# Patient Record
Sex: FEMALE | Race: WHITE | NOT HISPANIC OR LATINO | URBAN - METROPOLITAN AREA
[De-identification: names, ages, dates, MRNs, and addresses within clinical notes are randomized per-mention and may not be internally consistent; named-entity substitution may affect disease eponyms.]

---

## 2020-11-03 ENCOUNTER — APPOINTMENT (RX ONLY)
Dept: URBAN - METROPOLITAN AREA CLINIC 23 | Facility: CLINIC | Age: 60
Setting detail: DERMATOLOGY
End: 2020-11-03

## 2020-11-03 DIAGNOSIS — D22 MELANOCYTIC NEVI: ICD-10-CM

## 2020-11-03 DIAGNOSIS — L81.4 OTHER MELANIN HYPERPIGMENTATION: ICD-10-CM

## 2020-11-03 DIAGNOSIS — Z41.9 ENCOUNTER FOR PROCEDURE FOR PURPOSES OTHER THAN REMEDYING HEALTH STATE, UNSPECIFIED: ICD-10-CM

## 2020-11-03 DIAGNOSIS — L70.0 ACNE VULGARIS: ICD-10-CM

## 2020-11-03 DIAGNOSIS — L91.8 OTHER HYPERTROPHIC DISORDERS OF THE SKIN: ICD-10-CM

## 2020-11-03 DIAGNOSIS — Z71.89 OTHER SPECIFIED COUNSELING: ICD-10-CM

## 2020-11-03 DIAGNOSIS — L82.1 OTHER SEBORRHEIC KERATOSIS: ICD-10-CM

## 2020-11-03 DIAGNOSIS — D18.0 HEMANGIOMA: ICD-10-CM

## 2020-11-03 PROBLEM — D18.01 HEMANGIOMA OF SKIN AND SUBCUTANEOUS TISSUE: Status: ACTIVE | Noted: 2020-11-03

## 2020-11-03 PROBLEM — D22.5 MELANOCYTIC NEVI OF TRUNK: Status: ACTIVE | Noted: 2020-11-03

## 2020-11-03 PROCEDURE — ? COUNSELING

## 2020-11-03 PROCEDURE — 99203 OFFICE O/P NEW LOW 30 MIN: CPT | Mod: 25

## 2020-11-03 PROCEDURE — ? RECOMMENDATIONS

## 2020-11-03 PROCEDURE — 11200 RMVL SKIN TAGS UP TO&INC 15: CPT

## 2020-11-03 PROCEDURE — ? OTHER

## 2020-11-03 PROCEDURE — ? SKIN TAG REMOVAL

## 2020-11-03 PROCEDURE — ? PRESCRIPTION

## 2020-11-03 RX ORDER — TRETIONIN 1 MG/G
CREAM TOPICAL
Qty: 1 | Refills: 6 | Status: ERX | COMMUNITY
Start: 2020-11-03

## 2020-11-03 RX ADMIN — TRETIONIN: 1 CREAM TOPICAL at 00:00

## 2020-11-03 ASSESSMENT — LOCATION ZONE DERM
LOCATION ZONE: LEG
LOCATION ZONE: AXILLAE
LOCATION ZONE: TRUNK
LOCATION ZONE: FACE
LOCATION ZONE: NECK

## 2020-11-03 ASSESSMENT — LOCATION SIMPLE DESCRIPTION DERM
LOCATION SIMPLE: ABDOMEN
LOCATION SIMPLE: RIGHT FOREHEAD
LOCATION SIMPLE: LEFT AXILLARY VAULT
LOCATION SIMPLE: RIGHT LOWER BACK
LOCATION SIMPLE: TRAPEZIAL NECK
LOCATION SIMPLE: LEFT POPLITEAL SKIN
LOCATION SIMPLE: RIGHT CHEEK

## 2020-11-03 ASSESSMENT — LOCATION DETAILED DESCRIPTION DERM
LOCATION DETAILED: LEFT AXILLARY VAULT
LOCATION DETAILED: MID TRAPEZIAL NECK
LOCATION DETAILED: RIGHT INFERIOR MEDIAL MIDBACK
LOCATION DETAILED: EPIGASTRIC SKIN
LOCATION DETAILED: RIGHT FOREHEAD
LOCATION DETAILED: RIGHT CENTRAL MALAR CHEEK
LOCATION DETAILED: LEFT POPLITEAL SKIN

## 2020-11-03 NOTE — PROCEDURE: OTHER
Note Text (......Xxx Chief Complaint.): This diagnosis correlates with the
Other (Free Text): *** declines pathology ***
Detail Level: Detailed

## 2020-11-03 NOTE — PROCEDURE: RECOMMENDATIONS
Recommendation Preamble: The following recommendations were made during the visit: consultation with Karie
Detail Level: Zone

## 2020-11-03 NOTE — PROCEDURE: COUNSELING
Detail Level: Detailed
Spironolactone Pregnancy And Lactation Text: This medication can cause feminization of the male fetus and should be avoided during pregnancy. The active metabolite is also found in breast milk.
Topical Sulfur Applications Pregnancy And Lactation Text: This medication is Pregnancy Category C and has an unknown safety profile during pregnancy. It is unknown if this topical medication is excreted in breast milk.
Spironolactone Counseling: Patient advised regarding risks of diarrhea, abdominal pain, hyperkalemia, birth defects (for female patients), liver toxicity and renal toxicity. The patient may need blood work to monitor liver and kidney function and potassium levels while on therapy. The patient verbalized understanding of the proper use and possible adverse effects of spironolactone.  All of the patient's questions and concerns were addressed.
Dapsone Pregnancy And Lactation Text: This medication is Pregnancy Category C and is not considered safe during pregnancy or breast feeding.
Patient Specific Counseling (Will Not Stick From Patient To Patient): Declines any additional Rx treatment.
Topical Retinoid counseling:  Patient advised to apply a pea-sized amount only at bedtime and wait 30 minutes after washing their face before applying.  If too drying, patient may add a non-comedogenic moisturizer. The patient verbalized understanding of the proper use and possible adverse effects of retinoids.  All of the patient's questions and concerns were addressed.
Use Enhanced Medication Counseling?: No
Erythromycin Counseling:  I discussed with the patient the risks of erythromycin including but not limited to GI upset, allergic reaction, drug rash, diarrhea, increase in liver enzymes, and yeast infections.
Benzoyl Peroxide Pregnancy And Lactation Text: This medication is Pregnancy Category C. It is unknown if benzoyl peroxide is excreted in breast milk.
Bactrim Counseling:  I discussed with the patient the risks of sulfa antibiotics including but not limited to GI upset, allergic reaction, drug rash, diarrhea, dizziness, photosensitivity, and yeast infections.  Rarely, more serious reactions can occur including but not limited to aplastic anemia, agranulocytosis, methemoglobinemia, blood dyscrasias, liver or kidney failure, lung infiltrates or desquamative/blistering drug rashes.
Minocycline Pregnancy And Lactation Text: This medication is Pregnancy Category D and not consider safe during pregnancy. It is also excreted in breast milk.
Sarecycline Counseling: Patient advised regarding possible photosensitivity and discoloration of the teeth, skin, lips, tongue and gums.  Patient instructed to avoid sunlight, if possible.  When exposed to sunlight, patients should wear protective clothing, sunglasses, and sunscreen.  The patient was instructed to call the office immediately if the following severe adverse effects occur:  hearing changes, easy bruising/bleeding, severe headache, or vision changes.  The patient verbalized understanding of the proper use and possible adverse effects of sarecycline.  All of the patient's questions and concerns were addressed.
Isotretinoin Counseling: Patient should get monthly blood tests, not donate blood, not drive at night if vision affected, not share medication, and not undergo elective surgery for 6 months after tx completed. Side effects reviewed, pt to contact office should one occur.
Dapsone Counseling: I discussed with the patient the risks of dapsone including but not limited to hemolytic anemia, agranulocytosis, rashes, methemoglobinemia, kidney failure, peripheral neuropathy, headaches, GI upset, and liver toxicity.  Patients who start dapsone require monitoring including baseline LFTs and weekly CBCs for the first month, then every month thereafter.  The patient verbalized understanding of the proper use and possible adverse effects of dapsone.  All of the patient's questions and concerns were addressed.
Topical Sulfur Applications Counseling: Topical Sulfur Counseling: Patient counseled that this medication may cause skin irritation or allergic reactions.  In the event of skin irritation, the patient was advised to reduce the amount of the drug applied or use it less frequently.   The patient verbalized understanding of the proper use and possible adverse effects of topical sulfur application.  All of the patient's questions and concerns were addressed.
Bactrim Pregnancy And Lactation Text: This medication is Pregnancy Category D and is known to cause fetal risk.  It is also excreted in breast milk.
Topical Clindamycin Counseling: Patient counseled that this medication may cause skin irritation or allergic reactions.  In the event of skin irritation, the patient was advised to reduce the amount of the drug applied or use it less frequently.   The patient verbalized understanding of the proper use and possible adverse effects of clindamycin.  All of the patient's questions and concerns were addressed.
Minocycline Counseling: Patient advised regarding possible photosensitivity and discoloration of the teeth, skin, lips, tongue and gums.  Patient instructed to avoid sunlight, if possible.  When exposed to sunlight, patients should wear protective clothing, sunglasses, and sunscreen.  The patient was instructed to call the office immediately if the following severe adverse effects occur:  hearing changes, easy bruising/bleeding, severe headache, or vision changes.  The patient verbalized understanding of the proper use and possible adverse effects of minocycline.  All of the patient's questions and concerns were addressed.
Topical Retinoid Pregnancy And Lactation Text: This medication is Pregnancy Category C. It is unknown if this medication is excreted in breast milk.
Doxycycline Pregnancy And Lactation Text: This medication is Pregnancy Category D and not consider safe during pregnancy. It is also excreted in breast milk but is considered safe for shorter treatment courses.
High Dose Vitamin A Pregnancy And Lactation Text: High dose vitamin A therapy is contraindicated during pregnancy and breast feeding.
High Dose Vitamin A Counseling: Side effects reviewed, pt to contact office should one occur.
Erythromycin Pregnancy And Lactation Text: This medication is Pregnancy Category B and is considered safe during pregnancy. It is also excreted in breast milk.
Birth Control Pills Pregnancy And Lactation Text: This medication should be avoided if pregnant and for the first 30 days post-partum.
Topical Clindamycin Pregnancy And Lactation Text: This medication is Pregnancy Category B and is considered safe during pregnancy. It is unknown if it is excreted in breast milk.
Doxycycline Counseling:  Patient counseled regarding possible photosensitivity and increased risk for sunburn.  Patient instructed to avoid sunlight, if possible.  When exposed to sunlight, patients should wear protective clothing, sunglasses, and sunscreen.  The patient was instructed to call the office immediately if the following severe adverse effects occur:  hearing changes, easy bruising/bleeding, severe headache, or vision changes.  The patient verbalized understanding of the proper use and possible adverse effects of doxycycline.  All of the patient's questions and concerns were addressed.
Tetracycline Counseling: Patient counseled regarding possible photosensitivity and increased risk for sunburn.  Patient instructed to avoid sunlight, if possible.  When exposed to sunlight, patients should wear protective clothing, sunglasses, and sunscreen.  The patient was instructed to call the office immediately if the following severe adverse effects occur:  hearing changes, easy bruising/bleeding, severe headache, or vision changes.  The patient verbalized understanding of the proper use and possible adverse effects of tetracycline.  All of the patient's questions and concerns were addressed. Patient understands to avoid pregnancy while on therapy due to potential birth defects.
Isotretinoin Pregnancy And Lactation Text: This medication is Pregnancy Category X and is considered extremely dangerous during pregnancy. It is unknown if it is excreted in breast milk.
Benzoyl Peroxide Counseling: Patient counseled that medicine may cause skin irritation and bleach clothing.  In the event of skin irritation, the patient was advised to reduce the amount of the drug applied or use it less frequently.   The patient verbalized understanding of the proper use and possible adverse effects of benzoyl peroxide.  All of the patient's questions and concerns were addressed.
Tazorac Pregnancy And Lactation Text: This medication is not safe during pregnancy. It is unknown if this medication is excreted in breast milk.
Azithromycin Counseling:  I discussed with the patient the risks of azithromycin including but not limited to GI upset, allergic reaction, drug rash, diarrhea, and yeast infections.
Azithromycin Pregnancy And Lactation Text: This medication is considered safe during pregnancy and is also secreted in breast milk.
Tazorac Counseling:  Patient advised that medication is irritating and drying.  Patient may need to apply sparingly and wash off after an hour before eventually leaving it on overnight.  The patient verbalized understanding of the proper use and possible adverse effects of tazorac.  All of the patient's questions and concerns were addressed.
Birth Control Pills Counseling: Birth Control Pill Counseling: I discussed with the patient the potential side effects of OCPs including but not limited to increased risk of stroke, heart attack, thrombophlebitis, deep venous thrombosis, hepatic adenomas, breast changes, GI upset, headaches, and depression.  The patient verbalized understanding of the proper use and possible adverse effects of OCPs. All of the patient's questions and concerns were addressed.

## 2020-11-03 NOTE — PROCEDURE: SKIN TAG REMOVAL
Anesthesia Volume In Cc: 0.5
Anesthesia Type: 1% lidocaine without epinephrine and a 1:10 solution of 8.4% sodium bicarbonate
Medical Necessity Clause: This procedure was medically necessary because the lesions that were treated were: irritated
Hemostasis: Electrocautery
Include Z78.9 (Other Specified Conditions Influencing Health Status) As An Associated Diagnosis?: No
Consent: Written consent obtained and the risks of skin tag removal was reviewed with the patient including but not limited to bleeding, pigmentary change, infection, pain, and remote possibility of scarring.
Medical Necessity Information: It is in your best interest to select a reason for this procedure from the list below. All of these items fulfill various CMS LCD requirements except the new and changing color options.
Add Associated Diagnoses If Applicable When Selecting Medical Necessity: Yes
Detail Level: Detailed

## 2020-12-21 ENCOUNTER — APPOINTMENT (RX ONLY)
Dept: URBAN - METROPOLITAN AREA CLINIC 23 | Facility: CLINIC | Age: 60
Setting detail: DERMATOLOGY
End: 2020-12-21

## 2021-04-05 LAB
MAMMOGRAPHY, EXTERNAL: NORMAL
MAMMOGRAPHY, EXTERNAL: NORMAL

## 2021-07-26 ENCOUNTER — APPOINTMENT (RX ONLY)
Dept: URBAN - METROPOLITAN AREA CLINIC 23 | Facility: CLINIC | Age: 61
Setting detail: DERMATOLOGY
End: 2021-07-26

## 2021-07-26 DIAGNOSIS — Z41.9 ENCOUNTER FOR PROCEDURE FOR PURPOSES OTHER THAN REMEDYING HEALTH STATE, UNSPECIFIED: ICD-10-CM

## 2021-07-26 PROCEDURE — ? COSMETIC CONSULTATION: PRODUCTS

## 2021-07-26 ASSESSMENT — LOCATION DETAILED DESCRIPTION DERM: LOCATION DETAILED: RIGHT MEDIAL MALAR CHEEK

## 2021-07-26 ASSESSMENT — LOCATION SIMPLE DESCRIPTION DERM: LOCATION SIMPLE: RIGHT CHEEK

## 2021-07-26 ASSESSMENT — LOCATION ZONE DERM: LOCATION ZONE: FACE

## 2021-07-26 NOTE — HPI: COSMETIC CONSULTATION
Additional History: Interested in maintenance\\nIPL 1-3 a year 300 or series of 4 960\\nvessels 1-2 needed then 1 ayera $150\\nsold vitamin c20% and elta clear tinted\\ntalked about anteage md and eltastiderm eye cream

## 2021-09-28 PROBLEM — R23.2 HOT FLASHES: Status: ACTIVE | Noted: 2019-10-28

## 2021-09-28 PROBLEM — F41.1 ANXIETY STATE: Status: ACTIVE | Noted: 2019-05-20

## 2021-09-28 PROBLEM — F32.A MILD DEPRESSION: Status: ACTIVE | Noted: 2019-05-20

## 2021-09-28 PROBLEM — E78.2 MIXED HYPERLIPIDEMIA: Status: ACTIVE | Noted: 2020-11-03

## 2021-09-28 PROBLEM — Z12.31 BREAST CANCER SCREENING BY MAMMOGRAM: Status: ACTIVE | Noted: 2020-11-03

## 2021-09-28 PROBLEM — I10 ESSENTIAL HYPERTENSION: Status: ACTIVE | Noted: 2019-05-20

## 2021-09-28 PROBLEM — Z78.0 MENOPAUSE PRESENT: Status: ACTIVE | Noted: 2019-05-20

## 2021-09-28 PROBLEM — E03.9 ACQUIRED HYPOTHYROIDISM: Status: ACTIVE | Noted: 2019-05-20

## 2021-09-29 PROBLEM — N39.3 STRESS INCONTINENCE IN FEMALE: Status: ACTIVE | Noted: 2021-09-29

## 2021-09-29 PROBLEM — Z12.11 ENCOUNTER FOR SCREENING COLONOSCOPY: Status: ACTIVE | Noted: 2021-09-29

## 2021-09-29 PROBLEM — Z23 NEED FOR IMMUNIZATION AGAINST INFLUENZA: Status: ACTIVE | Noted: 2019-10-28

## 2021-09-29 PROBLEM — E66.9 OBESITY (BMI 30-39.9): Status: ACTIVE | Noted: 2021-09-29

## 2021-09-29 PROBLEM — Z00.00 PREVENTATIVE HEALTH CARE: Status: ACTIVE | Noted: 2021-09-29

## 2021-10-27 ENCOUNTER — HOSPITAL ENCOUNTER (OUTPATIENT)
Dept: NUTRITION | Age: 61
Discharge: HOME | End: 2021-10-27
Payer: COMMERCIAL

## 2021-10-27 PROCEDURE — 97802 MEDICAL NUTRITION INDIV IN: CPT

## 2021-10-27 NOTE — PROGRESS NOTES
Sudhir Petit, MS, RD, LD  Outpatient Registered Dietitian  [facility]  Phone: [phone]  Fax: [phone]    ASSESSMENT  Pt is a 62 y.o. female referred to us by Dr. Gumaro Clarke with the following diagnosis (es):  Asymptomatic menopausal state [Z78.0]  Obesity, unspecified [E66.9] for weight management MNT. Pt shares that she began gaining weight after the birth of her 2nd child in 57. In 2008 after a her divorce she unintentionally lost weight  \"went from a size 12 to a size 4\". She started struggling with regaining weight in 2016, gradually gaining from 155 pounds to now between 170-175 pounds. Office weight today= 173.3 pounds. She voices trying \"every diet\" from [employer], and keto without success. Voices she is motivated today to make behaviors changes to support her weight goals because she values her healthy and longevity, and would like to prevent diabetes. Factors identified that effect eating habits and lifestyle include emotional eater, eats out 2-3x per week, and loves to eat. Patient Active Problem List   Diagnosis Code    Acquired hypothyroidism E03.9    Essential hypertension I10    Menopause present Z78.0    Hot flashes R23.2    Mild depression ([address] F32.0    Mixed hyperlipidemia E78.2    Anxiety state F41.1    Breast cancer screening by mammogram Z12.31    Need for immunization against influenza Z23    Encounter for screening colonoscopy Z12.11    Obesity (BMI 30-39. 9) E66.9    Stress incontinence in female N39.3       Lab Results   Component Value Date/Time    Hemoglobin A1c 5.7 (H) 09/29/2021 10:47 AM    Glucose 98 09/29/2021 10:47 AM    Microalb/Creat ratio (ug/mg creat.) <4 09/29/2021 11:19 AM    Creatinine 0.73 09/29/2021 10:47 AM        Meds: Cymbalta, synthroid, Vitamin D3.    Barriers to change: established food preferences/eating behaviors    Social Support: Retired , now is a , and currently is in school for student counseling. Lives alone, son lives in [place], she is going to visit him to celebrate her 62th birthday. Diet Recall:   1 day recall: 2 protein bars, 3 cups of coffee, black beans, salsa, tater tots, water, 1/2 chicken onion cheddar flat bread with water, after dinner popcorn and 2 glasses of wine. Drinks 64 ounces daily, and 1 bottle of wine per week. Enjoys fist 1x per week, avoids sweet, limits sweet tea to only when she eats out. Eating pattern appears low in heart healthy fats and inadequate in fiber, high in added sugar, total carbohydrate and saturated fat. Physical Activity: inconsistent - no set duration or frequency- has tried variety of works outs and enjoys pure bar- felt stronger but did not see the weight results she was looking for. Sleep Habits: 5-7 hrs/night- states quality of sleep is fair- gets up to use the bathroom, and does not feel refreshed. Behaviors indicate current stage of change is: Action- states she is ready to make nutrition changes. Anthropometrics:   Estimated body mass index is 31.86 kg/m² as calculated from the following:    Height as of 9/29/21: 5' 2\" (1.575 m). Weight as of 9/29/21: 79 kg (174 lb 3.2 oz). Estimated Nutrition Needs:  MSJx 1.2= 1570kcal/day- to start. Protein: 79g/day (20%)    Carbohydrate: 175g/day (45%)    Fat: 61g/day (35%)   Concerns for metabolic dysregulation due to chronic dieting and poor sleep quality. DIAGNOSIS:  Unbalanced diet related to food preferences as evidenced by pt request for RD education and counseling and diet history. Obesity related to food preferences as evidenced by A1c: 5.6, pt request for RD education and counseling, and diet history. INTERVENTIONS:  75 minute appointment    Nutrition Prescription:  Modification of Meals and Snacks (RD Goals for pt):    Increase servings of nonstarchy vegetables from baseline  Replace saturated fat sources with unsaturated fat (nuts, seeds, avocado, olive oil)  Increase fiber intake to minimum of 25g/day (whole grains, fruit, vegetables)  Daily- weekly consumption of dairy, poultry, eggs  Consume fish 2 or more times weekly  Limit refined carbohydrates, added sugar, sugar sweetened beverages, processed meats, and fried foods to 2x per week or less  Water as primary beverage    Nutrition Education and Counseling  Reviewed MNT for weight management. Specifically emphasized research supporting eating 3 meals day and discussed caloric dense vs nutrient dense  Provided recommended portions of carbohydrate, protein, and fat as guide for meals and snacks. Discussed recommendations in the context of [employer]. Reviewed 7 day meal plan, and 2 day reset meal plan-- provide plans and grocery list to per client request. We discussed using these plans a guide to help in the process of learning how to meal plan and to work towards self efficacy. Pt agreeable. Materials Provided and Discussed:  Meal Planner Sheets, Grocery list and 7 day meal tracking tool    Utilized the following behavior change strategies: goal setting, self monitoring, and cognitive restructuring    Pt verbalized understanding of recommendations discussed. Anticipate excellent compliance with recommendations. Goal: Pt will structure meals and change food choices to facilitate 5 % weight loss within 6 months. Action Steps:           1. Track meals x 7 days          2. Follow 7 day meal plan + 2 day rest before next visit          3. Reduce alcohol intake to 1-5 ounce glass or wine or less daily.       MONITORING & EVALUATION:  Monitor food/nutrient intake and weight  Follow Up: 11/9 at 1pm

## 2021-11-19 ENCOUNTER — HOSPITAL ENCOUNTER (OUTPATIENT)
Dept: NUTRITION | Age: 61
Discharge: HOME | End: 2021-11-19
Payer: COMMERCIAL

## 2021-11-19 PROCEDURE — 97803 MED NUTRITION INDIV SUBSEQ: CPT

## 2021-11-19 NOTE — PROGRESS NOTES
Hortencia Mckeon, MS, RD, LD  Outpatient Registered Dietitian  [address] [facility]  Phone: [phone]  Fax: [phone]     ASSESSMENT  Pt is a 62 y.o. female referred to us by Dr. Deirdre Burns with the following diagnosis (es):  Asymptomatic menopausal state [Z78.0]  Obesity, unspecified [E66.9] for weight management MNT. 11/19/21: Here today for follow up visit. Met goals to track and log her meal intake for 2 weeks and try the 2 day reset. Shares since last appointment visited her son in [place] where she did not eat healthfully, after returning she planned meals out for 1 week, food cravings improved. On week 2, lunch meals were not planned out, and found that she was eating snack foods, was hungrier and snacking more. Water intake is > 64 ounces. Finds it challenging cooking for one person. Has a roommate who is a Vegan and meal preferences are \"very different\". Has not started exercising yet. [employer] class to help her stay accountable. Starting weight on 10/27/21: 173.3 pounds, weight today: 173.5 pounds. Motivation: Feeling good in her body. Intervention: 60 min appointment, Tracked meals were reviewed together, and suggestions were made to increased fiber and protein. Some days she drinks 0 glasses of wine while on other days >1 glass. We revisited decreasing alcohol intake to 1 glass per day or less. Provided mindfulness strategies to reduce intake. Reviewed ready to heat and eat options for lunch. 1 weeks of meals planned together today: Materials Provided and Discussed:Meal Planner Sheets, Grocery list and 14 day meal tracking tool. Goals: Continue tracking meals for 14 days, increase fiber intake in meals from vegetables and whole grains. Decrease intake of alcohol to 1 serving or less daily and progress toward 30 minutes of physical activity daily. 10/27/21 Initial Assessment: Pt shares that she began gaining weight after the birth of her 2nd child in 57.   In 2008 after a her divorce she unintentionally lost weight  \"went from a size 12 to a size 4\". She started struggling with regaining weight in 2016, gradually gaining from 155 pounds to now between 170-175 pounds. Office weight today= 173.3 pounds. She voices trying \"every diet\" from [employer], and keto without success. Voices she is motivated today to make behaviors changes to support her weight goals because she values her healthy and longevity, and would like to prevent diabetes. Factors identified that effect eating habits and lifestyle include emotional eater, eats out 2-3x per week, and loves to eat.           Patient Active Problem List   Diagnosis Code    Acquired hypothyroidism E03.9    Essential hypertension I10    Menopause present Z78.0    Hot flashes R23.2    Mild depression ([address] F32.0    Mixed hyperlipidemia E78.2    Anxiety state F41.1    Breast cancer screening by mammogram Z12.31    Need for immunization against influenza Z23    Encounter for screening colonoscopy Z12.11    Obesity (BMI 30-39. 9) E66.9    Stress incontinence in female N37. 3               Lab Results   Component Value Date/Time     Hemoglobin A1c 5.7 (H) 09/29/2021 10:47 AM     Glucose 98 09/29/2021 10:47 AM     Microalb/Creat ratio (ug/mg creat.) <4 09/29/2021 11:19 AM     Creatinine 0.73 09/29/2021 10:47 AM         Meds: Cymbalta, synthroid, Vitamin D3. Barriers to change: established food preferences/eating behaviors     Social Support: Retired , now is a , and currently is in school for student counseling. Lives alone, son lives in [place], she is going to visit him to celebrate her 62th birthday.      Diet Recall:   1 day recall: 2 protein bars, 3 cups of coffee, black beans, salsa, tater tots, water, 1/2 chicken onion cheddar flat bread with water, after dinner popcorn and 2 glasses of wine. Drinks 64 ounces daily, and 1 bottle of wine per week.  Enjoys fist 1x per week, avoids sweet, limits sweet tea to only when she eats out. Eating pattern appears low in heart healthy fats and inadequate in fiber, high in added sugar, total carbohydrate and saturated fat.     Physical Activity: inconsistent - no set duration or frequency- has tried variety of works outs and enjoys pure bar- felt stronger but did not see the weight results she was looking for.      Sleep Habits: 5-7 hrs/night- states quality of sleep is fair- gets up to use the bathroom, and does not feel refreshed.      Behaviors indicate current stage of change is: Action- states she is ready to make nutrition changes.      Anthropometrics:   Estimated body mass index is 31.86 kg/m² as calculated from the following:    Height as of 9/29/21: 5' 2\" (1.575 m). Weight as of 9/29/21: 79 kg (174 lb 3.2 oz).      Estimated Nutrition Needs:  MSJx 1.2= 1570kcal/day- to start. Protein: 79g/day (20%)    Carbohydrate: 175g/day (45%)    Fat: 61g/day (35%)   Concerns for metabolic dysregulation due to chronic dieting and poor sleep quality.     DIAGNOSIS:  Unbalanced diet related to food preferences as evidenced by pt request for RD education and counseling and diet history. Obesity related to food preferences as evidenced by A1c: 5.6, pt request for RD education and counseling, and diet history.        INTERVENTIONS:  1. 75 minute appointment     2. Nutrition Prescription:  Modification of Meals and Snacks (RD Goals for pt):   § Increase servings of nonstarchy vegetables from baseline  § Replace saturated fat sources with unsaturated fat (nuts, seeds, avocado, olive oil)  § Increase fiber intake to minimum of 25g/day (whole grains, fruit, vegetables)  § Daily- weekly consumption of dairy, poultry, eggs  § Consume fish 2 or more times weekly  § Limit refined carbohydrates, added sugar, sugar sweetened beverages, processed meats, and fried foods to 2x per week or less  [employer] as primary beverage     3.  Nutrition Education and Counseling  · Reviewed MNT for weight management. Specifically emphasized research supporting eating 3 meals day and discussed caloric dense vs nutrient dense  · Provided recommended portions of carbohydrate, protein, and fat as guide for meals and snacks. · Discussed recommendations in the context of MyPlate. · Reviewed 7 day meal plan, and 2 day reset meal plan-- provide plans and grocery list to per client request. We discussed using these plans a guide to help in the process of learning how to meal plan and to work towards self efficacy. Pt agreeable.        Materials Provided and Discussed:  Meal Planner Sheets, Grocery list and 7 day meal tracking tool     · Utilized the following behavior change strategies: goal setting, self monitoring, and cognitive restructuring     Pt verbalized understanding of recommendations discussed. Anticipate excellent compliance with recommendations.     Goal: Pt will structure meals and change food choices to facilitate 5 % weight loss within 6 months. Action Steps:           1. Track meals x 7 days          2. Follow 7 day meal plan + 2 day rest before next visit- accomplished goal          3.  Reduce alcohol intake to 1-5 ounce glass or wine or less daily.       MONITORING & EVALUATION:  Monitor food/nutrient intake and weight  Follow Up: 12/13 at 2:30pm

## 2021-12-13 ENCOUNTER — HOSPITAL ENCOUNTER (OUTPATIENT)
Dept: NUTRITION | Age: 61
Discharge: HOME | End: 2021-12-13
Payer: COMMERCIAL

## 2021-12-13 PROCEDURE — 97803 MED NUTRITION INDIV SUBSEQ: CPT

## 2021-12-13 NOTE — PROGRESS NOTES
ASSESSMENT  Pt is a 62 y.o. female referred to us by Dr. Elvia Bahena the following diagnosis (es):  Asymptomatic menopausal state [Z78.0]  Obesity, unspecified [E66.9] for weight management MNT.      12/13/2021: Here for 1 month follow up. Comes in tracking 14 days of meals. Meal timing has overall improved. She has been successful in increasing her non starchy vegetables for 2 servings per day, working towards a goal of 4-5 cups a day, drinking >64 ounces of water. Working on intake decreasing intake of alcohol. She tried to decrease fats in coffee but did not like it. Found she burnt out on the healthy choice simply line and started having cravings. Has not consistently exercised, though she is working on it for this week, plans to go 3x per week. Encouraged increasing activity or decreasing total carbohydrate intake. Office weight: 173 pounds (down 0.5 pounds) from last visit. Intervention: Documented meal journal x 14 days reviewed. Overall meal pattern is higher in carbohydrates than recommended intake, and on days higher in sodium from eating out, and saturated fats. Recommendations provided to cut back on daily intake of processed/wheat carbohydrates and increasing protein in meals for satiety. She will be always for 2 weeks in [place] and strategies provided to continue healthful behaviors without finding it stressful given family members do not follow a healthy way of eating per \[facility] [place] [facility]\"". Nutrition Goals: continue below, track meals and decrease daily intake of carbohydrates by 1-2 servings daily. Follow up in 3 weeks. 11/19/21: Here today for follow up visit. Met goals to track and log her meal intake for 2 weeks and try the 2 day reset. Shares since last appointment visited her son in [place] where she did not eat healthfully, after returning she planned meals out for 1 week, food cravings improved.   On week 2, lunch meals were not planned out, and found that she was eating snack foods, was hungrier and snacking more. Water intake is > 64 ounces. Finds it challenging cooking for one person. Has a roommate who is a Vegan and meal preferences are \"very different\". Has not started exercising yet. [employer] class to help her stay accountable. Starting weight on 10/27/21: 173.3 pounds, weight today: 173.5 pounds. Motivation: Feeling good in her body. Intervention: 60 min appointment, Tracked meals were reviewed together, and suggestions were made to increased fiber and protein. Some days she drinks 0 glasses of wine while on other days >1 glass. We revisited decreasing alcohol intake to 1 glass per day or less. Provided mindfulness strategies to reduce intake. Reviewed ready to heat and eat options for lunch. 1 weeks of meals planned together today: Materials Provided and Discussed:Meal Planner Sheets, Grocery list and 14 day meal tracking tool. Goals: Continue tracking meals for 14 days, increase fiber intake in meals from vegetables and whole grains. Decrease intake of alcohol to 1 serving or less daily and progress toward 30 minutes of physical activity daily.      10/27/21 Initial Assessment: Pt shares that she began gaining weight after the birth of her 2nd child in 1996.  In 2008 after a her divorce she unintentionally lost weight  \"went from a size 12 to a size 4\". She started struggling with regaining weight in 2016, gradually gaining from 155 pounds to now between 170-175 pounds. Office weight today= 173.3 pounds.  She voices trying [employer]" from weight watchers, and keto without success.  Voices she is motivated today to make behaviors changes to support her weight goals because she values her healthy and longevity, and would like to prevent diabetes.  Factors identified that effect eating habits and lifestyle include emotional eater, eats out 2-3x per week, and loves to eat.              Patient Active Problem List   Diagnosis Code    Acquired hypothyroidism E03.9    Essential hypertension I10    Menopause present Z78.0    Hot flashes R23.2    Mild depression ([address] F32.0    Mixed hyperlipidemia E78.2    Anxiety state F41.1    Breast cancer screening by mammogram Z12.31    Need for immunization against influenza Z23    Encounter for screening colonoscopy Z12.11    Obesity (BMI 30-39. 9) E66.9    Stress incontinence in female N37. 3                   Lab Results   Component Value Date/Time     Hemoglobin A1c 5.7 (H) 09/29/2021 10:47 AM     Glucose 98 09/29/2021 10:47 AM     Microalb/Creat ratio (ug/mg creat.) <4 09/29/2021 11:19 AM     Creatinine 0.73 09/29/2021 10:47 AM         Meds: Cymbalta, synthroid, Vitamin D3.   Barriers to change: established food preferences/eating behaviors     Social Support: Retired , now is a , and currently is in school for student counseling. Lives alone, son lives in [place], she is going to visit him to celebrate her 61th birthday.      Diet Recall:   1 day recall: 2 protein bars, 3 cups of coffee, black beans, salsa, tater tots, water, 1/2 chicken onion cheddar flat bread with water, after dinner popcorn and 2 glasses of wine. Drinks 64 ounces daily, and 1 bottle of wine per week.  Enjoys fist 1x per week, avoids sweet, limits sweet tea to only when she eats out. Eating pattern appears low in heart healthy fats and inadequate in fiber, high in added sugar, total carbohydrate and saturated fat.     Physical Activity: inconsistent - no set duration or frequency- has tried variety of works outs and enjoys pure bar- felt stronger but did not see the weight results she was looking for.      Sleep Habits: 5-7 hrs/night- states quality of sleep is fair- gets up to use the bathroom, and does not feel refreshed.      Behaviors indicate current stage of change is: Action- states she is ready to make nutrition changes.      Anthropometrics:   Estimated body mass index is 31.86 kg/m² as calculated from the following:    Height as of 9/29/21: 5' 2\" (1.575 m).    Weight as of 9/29/21: 79 kg (174 lb 3.2 oz).      Estimated Nutrition Needs:  MSJx 1.2= 1570kcal/day- to start. Protein: 79g/day (20%)    Carbohydrate: 175g/day (45%)    Fat: 61g/day (35%)   Concerns for metabolic dysregulation due to chronic dieting and poor sleep quality.     DIAGNOSIS:  Unbalanced diet related to food preferences as evidenced by pt request for RD education and counseling and diet history. Obesity related to food preferences as evidenced by A1c: 5.6, pt request for RD education and counseling, and diet history.        INTERVENTIONS:  1. 75 minute appointment     2. Nutrition Prescription:  Modification of Meals and Snacks (RD Goals for pt):   § Increase servings of nonstarchy vegetables from baseline  § Replace saturated fat sources with unsaturated fat (nuts, seeds, avocado, olive oil)  § Increase fiber intake to minimum of 25g/day (whole grains, fruit, vegetables)  § Daily- weekly consumption of dairy, poultry, eggs  § Consume fish 2 or more times weekly  § Limit refined carbohydrates, added sugar, sugar sweetened beverages, processed meats, and fried foods to 2x per week or less  [employer] as primary beverage     3. Nutrition Education and Counseling  · Reviewed MNT for weight management. Specifically emphasized research supporting eating 3 meals day and discussed caloric dense vs nutrient dense  · Provided recommended portions of carbohydrate, protein, and fat as guide for meals and snacks. · Discussed recommendations in the context of MyPlate. · Reviewed 7 day meal plan, and 2 day reset meal plan-- provide plans and grocery list to per client request. We discussed using these plans a guide to help in the process of learning how to meal plan and to work towards self efficacy.  Pt agreeable.        Materials Provided and Discussed:  Meal Planner Sheets, Grocery list and 7 day meal tracking tool     · Utilized the following behavior change strategies: goal setting, self monitoring, and cognitive restructuring     Pt verbalized understanding of recommendations discussed. Anticipate excellent compliance with recommendations.     Goal: Pt will structure meals and change food choices to facilitate 5 % weight loss within 6 months. Action Steps:           5. Track meals x 7 days          1.  Follow 7 day meal plan + 2 day rest before next visit- accomplished goal          3. Reduce alcohol intake to 1-5 ounce glass or wine or less daily.       MONITORING & EVALUATION:  Monitor food/nutrient intake and weight

## 2022-02-07 ENCOUNTER — TELEPHONE (OUTPATIENT)
Dept: NUTRITION | Age: 62
End: 2022-02-07

## 2022-02-07 NOTE — TELEPHONE ENCOUNTER
Nutrition Counseling: Contacted pt regarding referral. See notes documented in Nutrition Counseling Referral for details. No further follow-up contact from pt. Will close referral for this office.     [facility]  [phone]

## 2022-03-18 PROBLEM — F41.1 ANXIETY STATE: Status: ACTIVE | Noted: 2019-05-20

## 2022-03-18 PROBLEM — R23.2 HOT FLASHES: Status: ACTIVE | Noted: 2019-10-28

## 2022-03-18 PROBLEM — N39.3 STRESS INCONTINENCE IN FEMALE: Status: ACTIVE | Noted: 2021-09-29

## 2022-03-18 PROBLEM — Z12.11 ENCOUNTER FOR SCREENING COLONOSCOPY: Status: ACTIVE | Noted: 2021-09-29

## 2022-03-19 PROBLEM — E66.9 OBESITY (BMI 30-39.9): Status: ACTIVE | Noted: 2021-09-29

## 2022-03-19 PROBLEM — I10 ESSENTIAL HYPERTENSION: Status: ACTIVE | Noted: 2019-05-20

## 2022-03-19 PROBLEM — Z12.31 BREAST CANCER SCREENING BY MAMMOGRAM: Status: ACTIVE | Noted: 2020-11-03

## 2022-03-19 PROBLEM — F32.A MILD DEPRESSION: Status: ACTIVE | Noted: 2019-05-20

## 2022-03-19 PROBLEM — Z78.0 MENOPAUSE PRESENT: Status: ACTIVE | Noted: 2019-05-20

## 2022-03-19 PROBLEM — E03.9 ACQUIRED HYPOTHYROIDISM: Status: ACTIVE | Noted: 2019-05-20

## 2022-03-19 PROBLEM — E78.2 MIXED HYPERLIPIDEMIA: Status: ACTIVE | Noted: 2020-11-03

## 2022-03-20 PROBLEM — Z23 NEED FOR IMMUNIZATION AGAINST INFLUENZA: Status: ACTIVE | Noted: 2019-10-28

## 2022-04-15 PROBLEM — E78.2 MODERATE MIXED HYPERLIPIDEMIA NOT REQUIRING STATIN THERAPY: Status: ACTIVE | Noted: 2022-04-15

## 2022-05-11 ENCOUNTER — TELEPHONE (OUTPATIENT)
Dept: SURGERY | Age: 62
End: 2022-05-11

## 2022-05-23 RX ORDER — LEVOTHYROXINE SODIUM 0.07 MG/1
TABLET ORAL
Qty: 60 TABLET | Refills: 1 | Status: SHIPPED | OUTPATIENT
Start: 2022-05-23 | End: 2022-06-27 | Stop reason: SDUPTHER

## 2022-06-27 ENCOUNTER — OFFICE VISIT (OUTPATIENT)
Dept: INTERNAL MEDICINE CLINIC | Facility: CLINIC | Age: 62
End: 2022-06-27
Payer: COMMERCIAL

## 2022-06-27 VITALS
BODY MASS INDEX: 31.83 KG/M2 | WEIGHT: 173 LBS | OXYGEN SATURATION: 95 % | DIASTOLIC BLOOD PRESSURE: 65 MMHG | TEMPERATURE: 98 F | HEIGHT: 62 IN | SYSTOLIC BLOOD PRESSURE: 118 MMHG | HEART RATE: 83 BPM

## 2022-06-27 DIAGNOSIS — Z12.11 COLON CANCER SCREENING: ICD-10-CM

## 2022-06-27 DIAGNOSIS — F41.1 ANXIETY STATE: ICD-10-CM

## 2022-06-27 DIAGNOSIS — N39.3 STRESS INCONTINENCE IN FEMALE: ICD-10-CM

## 2022-06-27 DIAGNOSIS — R23.2 HOT FLASHES: ICD-10-CM

## 2022-06-27 DIAGNOSIS — E66.9 OBESITY (BMI 30-39.9): ICD-10-CM

## 2022-06-27 DIAGNOSIS — E03.9 ACQUIRED HYPOTHYROIDISM: ICD-10-CM

## 2022-06-27 DIAGNOSIS — E78.2 MIXED HYPERLIPIDEMIA: ICD-10-CM

## 2022-06-27 DIAGNOSIS — Z78.0 MENOPAUSE PRESENT: ICD-10-CM

## 2022-06-27 DIAGNOSIS — I10 ESSENTIAL HYPERTENSION: Primary | ICD-10-CM

## 2022-06-27 PROCEDURE — 99214 OFFICE O/P EST MOD 30 MIN: CPT | Performed by: INTERNAL MEDICINE

## 2022-06-27 RX ORDER — LOSARTAN POTASSIUM 50 MG/1
50 TABLET ORAL DAILY
Qty: 90 TABLET | Refills: 1 | Status: SHIPPED | OUTPATIENT
Start: 2022-06-27 | End: 2022-09-01 | Stop reason: SDUPTHER

## 2022-06-27 RX ORDER — TIRZEPATIDE 2.5 MG/.5ML
2.5 INJECTION, SOLUTION SUBCUTANEOUS WEEKLY
Qty: 0.5 ML | Refills: 4 | Status: SHIPPED | OUTPATIENT
Start: 2022-06-27 | End: 2022-09-01

## 2022-06-27 RX ORDER — DULOXETIN HYDROCHLORIDE 30 MG/1
30 CAPSULE, DELAYED RELEASE ORAL 2 TIMES DAILY
Qty: 180 CAPSULE | Refills: 1 | Status: SHIPPED | OUTPATIENT
Start: 2022-06-27 | End: 2022-09-01 | Stop reason: SDUPTHER

## 2022-06-27 RX ORDER — OXYBUTYNIN CHLORIDE 5 MG/1
5 TABLET ORAL DAILY
Qty: 90 TABLET | Refills: 1 | Status: SHIPPED | OUTPATIENT
Start: 2022-06-27 | End: 2022-09-01 | Stop reason: SDUPTHER

## 2022-06-27 RX ORDER — LEVOTHYROXINE SODIUM 0.07 MG/1
TABLET ORAL
Qty: 90 TABLET | Refills: 1 | Status: SHIPPED | OUTPATIENT
Start: 2022-06-27 | End: 2022-09-01 | Stop reason: SDUPTHER

## 2022-06-27 ASSESSMENT — PATIENT HEALTH QUESTIONNAIRE - PHQ9
SUM OF ALL RESPONSES TO PHQ QUESTIONS 1-9: 0
3. TROUBLE FALLING OR STAYING ASLEEP: 0
6. FEELING BAD ABOUT YOURSELF - OR THAT YOU ARE A FAILURE OR HAVE LET YOURSELF OR YOUR FAMILY DOWN: 0
9. THOUGHTS THAT YOU WOULD BE BETTER OFF DEAD, OR OF HURTING YOURSELF: 0
7. TROUBLE CONCENTRATING ON THINGS, SUCH AS READING THE NEWSPAPER OR WATCHING TELEVISION: 0
8. MOVING OR SPEAKING SO SLOWLY THAT OTHER PEOPLE COULD HAVE NOTICED. OR THE OPPOSITE, BEING SO FIGETY OR RESTLESS THAT YOU HAVE BEEN MOVING AROUND A LOT MORE THAN USUAL: 0
SUM OF ALL RESPONSES TO PHQ9 QUESTIONS 1 & 2: 0
1. LITTLE INTEREST OR PLEASURE IN DOING THINGS: 0
10. IF YOU CHECKED OFF ANY PROBLEMS, HOW DIFFICULT HAVE THESE PROBLEMS MADE IT FOR YOU TO DO YOUR WORK, TAKE CARE OF THINGS AT HOME, OR GET ALONG WITH OTHER PEOPLE: 0
4. FEELING TIRED OR HAVING LITTLE ENERGY: 0
SUM OF ALL RESPONSES TO PHQ QUESTIONS 1-9: 0
5. POOR APPETITE OR OVEREATING: 0
SUM OF ALL RESPONSES TO PHQ QUESTIONS 1-9: 0
2. FEELING DOWN, DEPRESSED OR HOPELESS: 0
SUM OF ALL RESPONSES TO PHQ QUESTIONS 1-9: 0

## 2022-06-27 ASSESSMENT — ANXIETY QUESTIONNAIRES
6. BECOMING EASILY ANNOYED OR IRRITABLE: 0
2. NOT BEING ABLE TO STOP OR CONTROL WORRYING: 0
GAD7 TOTAL SCORE: 0
4. TROUBLE RELAXING: 0
1. FEELING NERVOUS, ANXIOUS, OR ON EDGE: 0
7. FEELING AFRAID AS IF SOMETHING AWFUL MIGHT HAPPEN: 0
5. BEING SO RESTLESS THAT IT IS HARD TO SIT STILL: 0
IF YOU CHECKED OFF ANY PROBLEMS ON THIS QUESTIONNAIRE, HOW DIFFICULT HAVE THESE PROBLEMS MADE IT FOR YOU TO DO YOUR WORK, TAKE CARE OF THINGS AT HOME, OR GET ALONG WITH OTHER PEOPLE: NOT DIFFICULT AT ALL
3. WORRYING TOO MUCH ABOUT DIFFERENT THINGS: 0

## 2022-06-27 ASSESSMENT — ENCOUNTER SYMPTOMS
SINUS PAIN: 0
WHEEZING: 0
EYE PAIN: 0
BACK PAIN: 0
NAUSEA: 0
VOMITING: 0
ABDOMINAL PAIN: 0
COUGH: 0
SHORTNESS OF BREATH: 0
CONSTIPATION: 0
DIARRHEA: 0

## 2022-06-27 NOTE — PROGRESS NOTES
Lizzeth Pulliam (:  1960) is a 62 y.o. female,Established patient, here for evaluation of the following chief complaint(s):  Follow-up ( med refills )         ASSESSMENT/PLAN:  1. Essential hypertension  The following orders have not been finalized:  -     losartan (COZAAR) 50 MG tablet  2. Acquired hypothyroidism  The following orders have not been finalized:  -     levothyroxine (SYNTHROID) 75 MCG tablet  3. Mixed hyperlipidemia  4. Obesity (BMI 30-39.9)  5. Menopause present  The following orders have not been finalized:  -     estrogen, conjugated,-medroxyPROGESTERone (PREMPRO) 0.625-2.5 MG per tablet  6. Hot flashes  The following orders have not been finalized:  -     estrogen, conjugated,-medroxyPROGESTERone (PREMPRO) 0.625-2.5 MG per tablet  7. Anxiety state  The following orders have not been finalized:  -     DULoxetine (CYMBALTA) 30 MG extended release capsule  8. Stress incontinence in female  Assessment & Plan:  Patient has been tolerant of Oxybutynin, but has not had much success with improvement of her urinary frequency. She is being treated for stress incontinence. Patient is interested in continuing for an indefinite period to time while assessing for effectiveness. No follow-ups on file. Subjective   SUBJECTIVE/OBJECTIVE:  Patient is a 73-year-old woman with a medical history significant for essential hypertension, acquired hypothyroidism, stress incontinence, anxiety, depression, hyperlipidemia, obesity that presents for a follow-up visit. Patient's recent Synthroid dose increase was checked with a normal TSH recently. Patient's blood pressure previously well controlled in office maintained only on losartan 50. Very well controlled today at 118/65. Patient's weight in office today is down from her previous visit. Currently 173. Patient previously prescribed Adipex for weight loss efforts. She states that the medication is no longer effective will discontinue. Patient prescribed duloxetine to treat anxiety with depression as well as hot flashes associated with menopause. Patient feels that she has done everything that can be done to control her weight and is without significant improvement. Recent adipex has not made significant improvement. Her diet and exercise has improved dramatically over the past few months. She is interested in a trial of new weight loss medications. Reviewed new medication Terzepatide and will initiated at 2.5 for4 weeks followed by dose increase to 5 mg weekly. Previously prediabetic. Working concertedly at [employer] loss efforts and dietary improvement. She has had problems getting scheduled for colonoscopy. Interested in cologuard at this time. Review of Systems   Constitutional: Negative for activity change and fever. HENT: Negative for nosebleeds, postnasal drip, sinus pain and sneezing. Eyes: Negative for pain and visual disturbance. Respiratory: Negative for cough, shortness of breath and wheezing. Cardiovascular: Negative for chest pain and palpitations. Gastrointestinal: Negative for abdominal pain, constipation, diarrhea, nausea and vomiting. Endocrine: Negative for polydipsia and polyuria. Genitourinary: Negative for difficulty urinating and frequency. Musculoskeletal: Negative for arthralgias, back pain and joint swelling. Skin: Negative for rash and wound. Neurological: Negative for weakness, light-headedness and headaches. Psychiatric/Behavioral: Negative for agitation and behavioral problems. The patient is not nervous/anxious. Objective   Physical Exam  Vitals reviewed. Constitutional:       Appearance: Normal appearance. HENT:      Head: Normocephalic and atraumatic. Right Ear: External ear normal.      Left Ear: External ear normal.      Nose: Nose normal.      Mouth/Throat:      Mouth: Mucous membranes are moist.      Pharynx: Oropharynx is clear.    Eyes:      Extraocular Movements: Extraocular movements intact. Conjunctiva/sclera: Conjunctivae normal.   Cardiovascular:      Rate and Rhythm: Normal rate. Pulses: Normal pulses. Heart sounds: Normal heart sounds. Pulmonary:      Effort: Pulmonary effort is normal.      Breath sounds: Normal breath sounds. Abdominal:      General: Abdomen is flat. There is no distension. Palpations: Abdomen is soft. Musculoskeletal:         General: No swelling or deformity. Normal range of motion. Skin:     General: Skin is warm and dry. Neurological:      General: No focal deficit present. Mental Status: She is alert and oriented to person, place, and time. Mental status is at baseline. Psychiatric:         Mood and Affect: Mood normal.         Behavior: Behavior normal.         Thought Content: Thought content normal.         Judgment: Judgment normal.            On this date 6/27/2022 I have spent 30 minutes reviewing previous notes, test results and face to face with the patient discussing the diagnosis and importance of compliance with the treatment plan as well as documenting on the day of the visit. An electronic signature was used to authenticate this note.     --Fantasma Whitfield, DO

## 2022-06-27 NOTE — ASSESSMENT & PLAN NOTE
Patient has been tolerant of Oxybutynin, but has not had much success with improvement of her urinary frequency. She is being treated for stress incontinence. Patient is interested in continuing for an indefinite period to time while assessing for effectiveness.

## 2022-06-30 ENCOUNTER — TELEPHONE (OUTPATIENT)
Dept: INTERNAL MEDICINE CLINIC | Facility: CLINIC | Age: 62
End: 2022-06-30

## 2022-06-30 NOTE — TELEPHONE ENCOUNTER
Patient called and stated that she is needing a PA for the prescription   Tirzepatide [facility]) 2.5 MG/0.5ML SOPN    Contact her at [phone] with any questions

## 2022-09-01 ENCOUNTER — OFFICE VISIT (OUTPATIENT)
Dept: INTERNAL MEDICINE CLINIC | Facility: CLINIC | Age: 62
End: 2022-09-01
Payer: COMMERCIAL

## 2022-09-01 VITALS
HEART RATE: 79 BPM | TEMPERATURE: 98.2 F | WEIGHT: 174.6 LBS | OXYGEN SATURATION: 96 % | SYSTOLIC BLOOD PRESSURE: 127 MMHG | BODY MASS INDEX: 32.13 KG/M2 | HEIGHT: 62 IN | DIASTOLIC BLOOD PRESSURE: 73 MMHG

## 2022-09-01 DIAGNOSIS — Z78.0 MENOPAUSE PRESENT: ICD-10-CM

## 2022-09-01 DIAGNOSIS — F41.1 ANXIETY STATE: ICD-10-CM

## 2022-09-01 DIAGNOSIS — Z12.31 ENCOUNTER FOR SCREENING MAMMOGRAM FOR MALIGNANT NEOPLASM OF BREAST: Primary | ICD-10-CM

## 2022-09-01 DIAGNOSIS — N39.3 STRESS INCONTINENCE IN FEMALE: ICD-10-CM

## 2022-09-01 DIAGNOSIS — E55.9 VITAMIN D DEFICIENCY, UNSPECIFIED: ICD-10-CM

## 2022-09-01 DIAGNOSIS — Z12.11 COLON CANCER SCREENING: ICD-10-CM

## 2022-09-01 DIAGNOSIS — E03.9 ACQUIRED HYPOTHYROIDISM: ICD-10-CM

## 2022-09-01 DIAGNOSIS — I10 ESSENTIAL HYPERTENSION: ICD-10-CM

## 2022-09-01 DIAGNOSIS — F32.A MILD DEPRESSION: ICD-10-CM

## 2022-09-01 DIAGNOSIS — R23.2 HOT FLASHES: ICD-10-CM

## 2022-09-01 PROCEDURE — 99214 OFFICE O/P EST MOD 30 MIN: CPT | Performed by: INTERNAL MEDICINE

## 2022-09-01 RX ORDER — LOSARTAN POTASSIUM 50 MG/1
50 TABLET ORAL DAILY
Qty: 90 TABLET | Refills: 1 | Status: SHIPPED | OUTPATIENT
Start: 2022-09-01

## 2022-09-01 RX ORDER — DULOXETIN HYDROCHLORIDE 30 MG/1
30 CAPSULE, DELAYED RELEASE ORAL 2 TIMES DAILY
Qty: 180 CAPSULE | Refills: 1 | Status: SHIPPED | OUTPATIENT
Start: 2022-09-01

## 2022-09-01 RX ORDER — LEVOTHYROXINE SODIUM 0.07 MG/1
TABLET ORAL
Qty: 90 TABLET | Refills: 1 | Status: SHIPPED | OUTPATIENT
Start: 2022-09-01

## 2022-09-01 RX ORDER — OXYBUTYNIN CHLORIDE 5 MG/1
5 TABLET ORAL DAILY
Qty: 90 TABLET | Refills: 1 | Status: SHIPPED | OUTPATIENT
Start: 2022-09-01

## 2022-09-01 ASSESSMENT — PATIENT HEALTH QUESTIONNAIRE - PHQ9
SUM OF ALL RESPONSES TO PHQ QUESTIONS 1-9: 0
2. FEELING DOWN, DEPRESSED OR HOPELESS: 0
SUM OF ALL RESPONSES TO PHQ QUESTIONS 1-9: 0
8. MOVING OR SPEAKING SO SLOWLY THAT OTHER PEOPLE COULD HAVE NOTICED. OR THE OPPOSITE, BEING SO FIGETY OR RESTLESS THAT YOU HAVE BEEN MOVING AROUND A LOT MORE THAN USUAL: 0
10. IF YOU CHECKED OFF ANY PROBLEMS, HOW DIFFICULT HAVE THESE PROBLEMS MADE IT FOR YOU TO DO YOUR WORK, TAKE CARE OF THINGS AT HOME, OR GET ALONG WITH OTHER PEOPLE: 0
SUM OF ALL RESPONSES TO PHQ9 QUESTIONS 1 & 2: 0
9. THOUGHTS THAT YOU WOULD BE BETTER OFF DEAD, OR OF HURTING YOURSELF: 0
6. FEELING BAD ABOUT YOURSELF - OR THAT YOU ARE A FAILURE OR HAVE LET YOURSELF OR YOUR FAMILY DOWN: 0
SUM OF ALL RESPONSES TO PHQ QUESTIONS 1-9: 0
7. TROUBLE CONCENTRATING ON THINGS, SUCH AS READING THE NEWSPAPER OR WATCHING TELEVISION: 0
3. TROUBLE FALLING OR STAYING ASLEEP: 0
4. FEELING TIRED OR HAVING LITTLE ENERGY: 0
SUM OF ALL RESPONSES TO PHQ QUESTIONS 1-9: 0
1. LITTLE INTEREST OR PLEASURE IN DOING THINGS: 0
5. POOR APPETITE OR OVEREATING: 0

## 2022-09-01 ASSESSMENT — ENCOUNTER SYMPTOMS
BACK PAIN: 0
COUGH: 0
NAUSEA: 0
DIARRHEA: 0
WHEEZING: 0
SINUS PAIN: 0
CONSTIPATION: 0
EYE PAIN: 0
VOMITING: 0
ABDOMINAL PAIN: 0
SHORTNESS OF BREATH: 0

## 2022-09-01 NOTE — PROGRESS NOTES
Britney Nj (: 1960) presents today for:  Chief Complaint   Patient presents with    Follow-up     Pt is here for 4 month f/u. Medication Refill         Patient is a 52-year-old woman with a history significant for hot flashes, HTN, hypothyroidism, stress incontinence, hyperlipidemia that presents for a follow up visit. Xi labs have yet to be drawn. Mounjaro ineffective for patient in an effort to lose weight. Her appetite was appropriately curbed, but no expected weight loss. Assessment/Plan:  1. Encounter for screening mammogram for malignant neoplasm of breast    2. Hot flashes    3. Acquired hypothyroidism    4. Essential hypertension    5. Mild depression ([address]    6. Anxiety state    7. Menopause present    8. Stress incontinence in female    5. Colon cancer screening    10.  Vitamin D deficiency, unspecified        Orders Placed This Encounter   Procedures    Cologuard (Fecal DNA Colorectal Cancer Screening)    [facility] DIGITAL SCREEN W OR WO CAD BILATERAL     Standing Status:   Future     Standing Expiration Date:   2023         Orders Placed This Encounter   Medications    levothyroxine (SYNTHROID) 75 MCG tablet     Sig: TAKE 1 TABLET BY MOUTH DAILY BEFORE BREAKFAST     Dispense:  90 tablet     Refill:  1    DULoxetine (CYMBALTA) 30 MG extended release capsule     Sig: Take 1 capsule by mouth 2 times daily     Dispense:  180 capsule     Refill:  1    estrogen, conjugated,-medroxyPROGESTERone (PREMPRO) 0.625-2.5 MG per tablet     Sig: Take 1 tablet by mouth daily     Dispense:  90 tablet     Refill:  1    losartan (COZAAR) 50 MG tablet     Sig: Take 1 tablet by mouth daily     Dispense:  90 tablet     Refill:  1    oxybutynin (DITROPAN) 5 MG tablet     Sig: Take 1 tablet by mouth daily     Dispense:  90 tablet     Refill:  1    Cholecalciferol 50 MCG ( UT) CAPS     Sig: Take 4,000 Units by mouth daily     Dispense:  180 capsule     Refill:  3           No follow-ups on file.     Reviewed and updated this visit by provider:  Tobacco  Allergies  Meds  Problems  Med Hx  Surg Hx  Fam Hx         Review of Systems   Constitutional:  Negative for activity change and fever. HENT:  Negative for nosebleeds, postnasal drip, sinus pain and sneezing. Eyes:  Negative for pain and visual disturbance. Respiratory:  Negative for cough, shortness of breath and wheezing. Cardiovascular:  Negative for chest pain and palpitations. Gastrointestinal:  Negative for abdominal pain, constipation, diarrhea, nausea and vomiting. Endocrine: Negative for polydipsia and polyuria. Genitourinary:  Negative for difficulty urinating and frequency. Musculoskeletal:  Negative for arthralgias, back pain and joint swelling. Skin:  Negative for rash and wound. Neurological:  Negative for weakness, light-headedness and headaches. Psychiatric/Behavioral:  Negative for agitation and behavioral problems. The patient is not nervous/anxious. Visit Vitals  /73   Pulse 79   Temp 98.2 °F (36.8 °C)   Ht 5' 2\" (1.575 m)   Wt 174 lb 9.6 oz (79.2 kg)   SpO2 96%   BMI 31.93 kg/m²       Physical Exam  Vitals reviewed. Constitutional:       Appearance: Normal appearance. HENT:      Head: Normocephalic and atraumatic. Right Ear: External ear normal.      Left Ear: External ear normal.      Nose: Nose normal.      Mouth/Throat:      Mouth: Mucous membranes are moist.      Pharynx: Oropharynx is clear. Eyes:      Extraocular Movements: Extraocular movements intact. Conjunctiva/sclera: Conjunctivae normal.   Cardiovascular:      Rate and Rhythm: Normal rate. Pulses: Normal pulses. Heart sounds: Normal heart sounds. Pulmonary:      Effort: Pulmonary effort is normal.      Breath sounds: Normal breath sounds. Abdominal:      General: Abdomen is flat. There is no distension. Palpations: Abdomen is soft. Musculoskeletal:         General: No swelling or deformity. Normal range of motion. Skin:     General: Skin is warm and dry. Neurological:      General: No focal deficit present. Mental Status: She is alert and oriented to person, place, and time. Mental status is at baseline. Psychiatric:         Mood and Affect: Mood normal.         Behavior: Behavior normal.         Thought Content: Thought content normal.         Judgment: Judgment normal.       On this date 9/1/2022 I have spent 30 minutes reviewing previous notes, test results and face to face with the patient discussing the diagnosis and importance of compliance with the treatment plan as well as documenting on the day of the visit.     Sanford Keen, DO

## 2022-09-01 NOTE — ACP (ADVANCE CARE PLANNING)
Advance Care Planning   Healthcare Decision Maker:  Faheem Ley    Click here to complete Healthcare Decision Makers including selection of the Healthcare Decision Maker Relationship (ie \"Primary\").

## 2022-10-07 DIAGNOSIS — E55.9 VITAMIN D DEFICIENCY, UNSPECIFIED: ICD-10-CM

## 2022-10-07 RX ORDER — ACETAMINOPHEN 160 MG
TABLET,DISINTEGRATING ORAL
Qty: 90 CAPSULE | Refills: 1 | OUTPATIENT
Start: 2022-10-07

## 2022-12-10 LAB
ALBUMIN SERPL-MCNC: 3.8 G/DL (ref 3.2–4.6)
ALBUMIN/GLOB SERPL: 1.2 {RATIO} (ref 0.4–1.6)
ALP SERPL-CCNC: 84 U/L (ref 50–136)
ALT SERPL-CCNC: 19 U/L (ref 12–65)
ANION GAP SERPL CALC-SCNC: 8 MMOL/L (ref 2–11)
AST SERPL-CCNC: 11 U/L (ref 15–37)
BILIRUB SERPL-MCNC: 0.3 MG/DL (ref 0.2–1.1)
BUN SERPL-MCNC: 19 MG/DL (ref 8–23)
CALCIUM SERPL-MCNC: 9.5 MG/DL (ref 8.3–10.4)
CHLORIDE SERPL-SCNC: 103 MMOL/L (ref 101–110)
CHOLEST SERPL-MCNC: 227 MG/DL
CO2 SERPL-SCNC: 25 MMOL/L (ref 21–32)
CREAT SERPL-MCNC: 0.8 MG/DL (ref 0.6–1)
EST. AVERAGE GLUCOSE BLD GHB EST-MCNC: 114 MG/DL
GLOBULIN SER CALC-MCNC: 3.3 G/DL (ref 2.8–4.5)
GLUCOSE SERPL-MCNC: 112 MG/DL (ref 65–100)
HBA1C MFR BLD: 5.6 % (ref 4.8–5.6)
HDLC SERPL-MCNC: 62 MG/DL (ref 40–60)
HDLC SERPL: 3.7 {RATIO}
LDLC SERPL CALC-MCNC: 123.2 MG/DL
POTASSIUM SERPL-SCNC: 4.2 MMOL/L (ref 3.5–5.1)
PROT SERPL-MCNC: 7.1 G/DL (ref 6.3–8.2)
SODIUM SERPL-SCNC: 136 MMOL/L (ref 133–143)
TRIGL SERPL-MCNC: 209 MG/DL (ref 35–150)
TSH, 3RD GENERATION: 3.05 UIU/ML (ref 0.36–3.74)
VLDLC SERPL CALC-MCNC: 41.8 MG/DL (ref 6–23)

## 2022-12-22 ENCOUNTER — OFFICE VISIT (OUTPATIENT)
Dept: INTERNAL MEDICINE CLINIC | Facility: CLINIC | Age: 62
End: 2022-12-22
Payer: COMMERCIAL

## 2022-12-22 VITALS
DIASTOLIC BLOOD PRESSURE: 67 MMHG | TEMPERATURE: 98.2 F | HEART RATE: 71 BPM | OXYGEN SATURATION: 98 % | HEIGHT: 62 IN | SYSTOLIC BLOOD PRESSURE: 129 MMHG | WEIGHT: 177.6 LBS | BODY MASS INDEX: 32.68 KG/M2

## 2022-12-22 DIAGNOSIS — F41.1 ANXIETY STATE: ICD-10-CM

## 2022-12-22 DIAGNOSIS — R23.2 HOT FLASHES: ICD-10-CM

## 2022-12-22 DIAGNOSIS — E78.2 MIXED HYPERLIPIDEMIA: ICD-10-CM

## 2022-12-22 DIAGNOSIS — E03.9 ACQUIRED HYPOTHYROIDISM: ICD-10-CM

## 2022-12-22 DIAGNOSIS — Z78.0 MENOPAUSE PRESENT: ICD-10-CM

## 2022-12-22 DIAGNOSIS — E66.9 OBESITY (BMI 30-39.9): ICD-10-CM

## 2022-12-22 DIAGNOSIS — F32.A MILD DEPRESSION: ICD-10-CM

## 2022-12-22 DIAGNOSIS — I10 ESSENTIAL HYPERTENSION: Primary | ICD-10-CM

## 2022-12-22 DIAGNOSIS — N39.3 STRESS INCONTINENCE IN FEMALE: ICD-10-CM

## 2022-12-22 PROCEDURE — 3078F DIAST BP <80 MM HG: CPT | Performed by: INTERNAL MEDICINE

## 2022-12-22 PROCEDURE — 3074F SYST BP LT 130 MM HG: CPT | Performed by: INTERNAL MEDICINE

## 2022-12-22 PROCEDURE — 99214 OFFICE O/P EST MOD 30 MIN: CPT | Performed by: INTERNAL MEDICINE

## 2022-12-22 RX ORDER — LEVOTHYROXINE SODIUM 0.07 MG/1
TABLET ORAL
Qty: 90 TABLET | Refills: 1 | Status: SHIPPED | OUTPATIENT
Start: 2022-12-22

## 2022-12-22 RX ORDER — LOSARTAN POTASSIUM 50 MG/1
50 TABLET ORAL DAILY
Qty: 90 TABLET | Refills: 1 | Status: SHIPPED | OUTPATIENT
Start: 2022-12-22

## 2022-12-22 RX ORDER — DULOXETIN HYDROCHLORIDE 60 MG/1
60 CAPSULE, DELAYED RELEASE ORAL DAILY
Qty: 90 CAPSULE | Refills: 1 | Status: SHIPPED | OUTPATIENT
Start: 2022-12-22

## 2022-12-22 RX ORDER — OXYBUTYNIN CHLORIDE 5 MG/1
5 TABLET ORAL DAILY
Qty: 90 TABLET | Refills: 1 | Status: SHIPPED | OUTPATIENT
Start: 2022-12-22

## 2022-12-22 ASSESSMENT — ENCOUNTER SYMPTOMS
ABDOMINAL PAIN: 0
SHORTNESS OF BREATH: 0
EYE PAIN: 0
WHEEZING: 0
SINUS PAIN: 0
NAUSEA: 0
VOMITING: 0
DIARRHEA: 0
COUGH: 0
CONSTIPATION: 0
BACK PAIN: 0

## 2022-12-22 ASSESSMENT — PATIENT HEALTH QUESTIONNAIRE - PHQ9
7. TROUBLE CONCENTRATING ON THINGS, SUCH AS READING THE NEWSPAPER OR WATCHING TELEVISION: 0
8. MOVING OR SPEAKING SO SLOWLY THAT OTHER PEOPLE COULD HAVE NOTICED. OR THE OPPOSITE, BEING SO FIGETY OR RESTLESS THAT YOU HAVE BEEN MOVING AROUND A LOT MORE THAN USUAL: 0
4. FEELING TIRED OR HAVING LITTLE ENERGY: 0
SUM OF ALL RESPONSES TO PHQ9 QUESTIONS 1 & 2: 0
SUM OF ALL RESPONSES TO PHQ QUESTIONS 1-9: 0
10. IF YOU CHECKED OFF ANY PROBLEMS, HOW DIFFICULT HAVE THESE PROBLEMS MADE IT FOR YOU TO DO YOUR WORK, TAKE CARE OF THINGS AT HOME, OR GET ALONG WITH OTHER PEOPLE: 0
SUM OF ALL RESPONSES TO PHQ QUESTIONS 1-9: 0
9. THOUGHTS THAT YOU WOULD BE BETTER OFF DEAD, OR OF HURTING YOURSELF: 0
SUM OF ALL RESPONSES TO PHQ QUESTIONS 1-9: 0
SUM OF ALL RESPONSES TO PHQ QUESTIONS 1-9: 0
2. FEELING DOWN, DEPRESSED OR HOPELESS: 0
3. TROUBLE FALLING OR STAYING ASLEEP: 0
5. POOR APPETITE OR OVEREATING: 0
6. FEELING BAD ABOUT YOURSELF - OR THAT YOU ARE A FAILURE OR HAVE LET YOURSELF OR YOUR FAMILY DOWN: 0
1. LITTLE INTEREST OR PLEASURE IN DOING THINGS: 0

## 2022-12-22 NOTE — PROGRESS NOTES
Ayden Barron (: 1960) presents today for:  Chief Complaint   Patient presents with    6 Month Follow-Up     Pt is here for routine check up. Pt has no complaints. Medication Refill       Patient is a 26-year-old woman with a medical history significant for hot flashes, hypertension, acquired hypothyroidism, stress incontinence, mild depression with anxiety that presents for 6-month follow-up visit. Patient's recent labs with repeated elevations in cholesterol profile. Patient's ASCVD risk currently The 10-year ASCVD risk score (Richie REED, et al., 2019) is: 4.9%    Values used to calculate the score:      Age: 65 years      Sex: Female      Is Non- : No      Diabetic: No      Tobacco smoker: No      Systolic Blood Pressure: 909 mmHg      Is BP treated: Yes      HDL Cholesterol: 62 MG/DL      Total Cholesterol: 227 MG/DL  She is maintained currently on losartan 50. Will encourage patient to modify diet and exercise factors to include more fiber. Less cholesterol rich foods. 150 minutes of moderate to vigorous physical activity recommended on a weekly basis. She is concerned by her continued elevated weight. Recent move. She has access to [employer] machine at her house. She would like to try to get involved in yoga again. Assessment/Plan:  1. Essential hypertension    2. Acquired hypothyroidism    3. Mixed hyperlipidemia    4. Mild depression    5. Obesity (BMI 30-39.9)    6. Anxiety state    7. Hot flashes    8. Menopause present    9.  Stress incontinence in female        Orders Placed This Encounter   Procedures    Hemoglobin A1C     Standing Status:   Future     Standing Expiration Date:   2023    Comprehensive Metabolic Panel     Standing Status:   Future     Standing Expiration Date:   2023    TSH with Reflex     Standing Status:   Future     Standing Expiration Date:   2023         Orders Placed This Encounter   Medications levothyroxine (SYNTHROID) 75 MCG tablet     Sig: TAKE 1 TABLET BY MOUTH DAILY BEFORE BREAKFAST     Dispense:  90 tablet     Refill:  1    DULoxetine (CYMBALTA) 60 MG extended release capsule     Sig: Take 1 capsule by mouth daily     Dispense:  90 capsule     Refill:  1    estrogen, conjugated,-medroxyPROGESTERone (PREMPRO) 0.625-2.5 MG per tablet     Sig: Take 1 tablet by mouth daily     Dispense:  30 tablet     Refill:  5    losartan (COZAAR) 50 MG tablet     Sig: Take 1 tablet by mouth daily     Dispense:  90 tablet     Refill:  1    oxybutynin (DITROPAN) 5 MG tablet     Sig: Take 1 tablet by mouth daily     Dispense:  90 tablet     Refill:  1           No follow-ups on file. Reviewed and updated this visit by provider:  Tobacco  Allergies  Meds  Problems  Med Hx  Surg Hx  Fam Hx         Review of Systems   Constitutional:  Negative for activity change and fever. HENT:  Negative for nosebleeds, postnasal drip, sinus pain and sneezing. Eyes:  Negative for pain and visual disturbance. Respiratory:  Negative for cough, shortness of breath and wheezing. Cardiovascular:  Negative for chest pain and palpitations. Gastrointestinal:  Negative for abdominal pain, constipation, diarrhea, nausea and vomiting. Endocrine: Negative for polydipsia and polyuria. Genitourinary:  Negative for difficulty urinating and frequency. Musculoskeletal:  Negative for arthralgias, back pain and joint swelling. Skin:  Negative for rash and wound. Neurological:  Negative for weakness, light-headedness and headaches. Psychiatric/Behavioral:  Negative for agitation and behavioral problems. The patient is not nervous/anxious. Visit Vitals  /67   Pulse 71   Temp 98.2 °F (36.8 °C)   Ht 5' 2\" (1.575 m)   Wt 177 lb 9.6 oz (80.6 kg)   SpO2 98%   BMI 32.48 kg/m²       Physical Exam  Vitals reviewed. Constitutional:       Appearance: Normal appearance. HENT:      Head: Normocephalic and atraumatic. Right Ear: External ear normal.      Left Ear: External ear normal.      Nose: Nose normal.      Mouth/Throat:      Mouth: Mucous membranes are moist.      Pharynx: Oropharynx is clear. Eyes:      Extraocular Movements: Extraocular movements intact. Conjunctiva/sclera: Conjunctivae normal.   Cardiovascular:      Rate and Rhythm: Normal rate. Pulses: Normal pulses. Heart sounds: Normal heart sounds. Pulmonary:      Effort: Pulmonary effort is normal.      Breath sounds: Normal breath sounds. Abdominal:      General: Abdomen is flat. There is no distension. Palpations: Abdomen is soft. Musculoskeletal:         General: No swelling or deformity. Normal range of motion. Skin:     General: Skin is warm and dry. Neurological:      General: No focal deficit present. Mental Status: She is alert and oriented to person, place, and time. Mental status is at baseline. Psychiatric:         Mood and Affect: Mood normal.         Behavior: Behavior normal.         Thought Content:  Thought content normal.         Judgment: Judgment normal.           Franko Beyer,

## 2022-12-22 NOTE — ACP (ADVANCE CARE PLANNING)
Advance Care Planning   Healthcare Decision Maker:    Primary Decision Maker: Jude Agarwal  Abigail - [phone]    Click here to complete Healthcare Decision Makers including selection of the Healthcare Decision Maker Relationship (ie \"Primary\"). Today we documented Decision Maker(s) consistent with Legal Next of Kin hierarchy.        If the relationship to the patient does NOT follow our state's Next of Kin hierarchy, the patient MUST complete an ACP Document to allow him/her to act on the patient's behalf. :

## 2023-07-05 ENCOUNTER — APPOINTMENT (RX ONLY)
Dept: URBAN - METROPOLITAN AREA CLINIC 24 | Facility: CLINIC | Age: 63
Setting detail: DERMATOLOGY
End: 2023-07-05

## 2023-07-05 DIAGNOSIS — Z41.9 ENCOUNTER FOR PROCEDURE FOR PURPOSES OTHER THAN REMEDYING HEALTH STATE, UNSPECIFIED: ICD-10-CM

## 2023-07-05 DIAGNOSIS — L57.8 OTHER SKIN CHANGES DUE TO CHRONIC EXPOSURE TO NONIONIZING RADIATION: ICD-10-CM

## 2023-07-05 PROCEDURE — ? COSMETIC CONSULTATION: BOTULINUM TOXIN

## 2023-07-05 PROCEDURE — ? COUNSELING

## 2023-07-05 PROCEDURE — ? PRESCRIPTION

## 2023-07-05 PROCEDURE — ? COSMETIC CONSULTATION: FILLERS

## 2023-07-05 PROCEDURE — 99214 OFFICE O/P EST MOD 30 MIN: CPT

## 2023-07-05 PROCEDURE — ? OTHER

## 2023-07-05 RX ORDER — TRETIONIN 1 MG/G
CREAM TOPICAL
Qty: 45 | Refills: 5 | Status: ERX

## 2023-07-05 ASSESSMENT — LOCATION DETAILED DESCRIPTION DERM
LOCATION DETAILED: LEFT INFERIOR CENTRAL MALAR CHEEK
LOCATION DETAILED: LEFT CENTRAL BUCCAL CHEEK
LOCATION DETAILED: LEFT INFERIOR MEDIAL FOREHEAD
LOCATION DETAILED: INFERIOR MID FOREHEAD
LOCATION DETAILED: RIGHT INFERIOR CENTRAL MALAR CHEEK
LOCATION DETAILED: RIGHT CENTRAL BUCCAL CHEEK

## 2023-07-05 ASSESSMENT — LOCATION SIMPLE DESCRIPTION DERM
LOCATION SIMPLE: RIGHT CHEEK
LOCATION SIMPLE: LEFT CHEEK
LOCATION SIMPLE: INFERIOR FOREHEAD
LOCATION SIMPLE: LEFT FOREHEAD

## 2023-07-05 ASSESSMENT — LOCATION ZONE DERM: LOCATION ZONE: FACE

## 2023-07-05 NOTE — PROCEDURE: OTHER
Detail Level: Zone
Note Text (......Xxx Chief Complaint.): This diagnosis correlates with the
Render Risk Assessment In Note?: no
Other (Free Text): Micro needling discussed\\nRec Steph MANTILLA

## 2023-07-05 NOTE — HPI: FACE (AGING FACE)
How Severe Is It?: mild
Is This A New Presentation, Or A Follow-Up?: Aging Face
Additional History: Wants RX for Tretinoin

## 2023-10-27 ENCOUNTER — TRANSCRIBE ORDERS (OUTPATIENT)
Dept: SCHEDULING | Age: 63
End: 2023-10-27

## 2023-10-27 DIAGNOSIS — Z12.31 SCREENING MAMMOGRAM, ENCOUNTER FOR: Primary | ICD-10-CM

## 2023-12-02 ENCOUNTER — HOSPITAL ENCOUNTER (OUTPATIENT)
Dept: MAMMOGRAPHY | Age: 63
End: 2023-12-02
Payer: COMMERCIAL

## 2023-12-02 DIAGNOSIS — Z12.31 SCREENING MAMMOGRAM, ENCOUNTER FOR: ICD-10-CM

## 2023-12-02 PROCEDURE — 77063 BREAST TOMOSYNTHESIS BI: CPT
